# Patient Record
Sex: MALE | Race: BLACK OR AFRICAN AMERICAN | NOT HISPANIC OR LATINO | URBAN - METROPOLITAN AREA
[De-identification: names, ages, dates, MRNs, and addresses within clinical notes are randomized per-mention and may not be internally consistent; named-entity substitution may affect disease eponyms.]

---

## 2020-07-25 ENCOUNTER — TELEPHONE ENCOUNTER (OUTPATIENT)
Dept: URBAN - METROPOLITAN AREA CLINIC 13 | Facility: CLINIC | Age: 70
End: 2020-07-25

## 2020-07-25 RX ORDER — TELMISARTAN AND HYDROCHLOROTHIAZIDE 80; 25 MG/1; MG/1
TAKE 1 TABLET DAILY TABLET ORAL
Refills: 0 | OUTPATIENT
Start: 2010-12-02 | End: 2011-01-24

## 2020-07-26 ENCOUNTER — TELEPHONE ENCOUNTER (OUTPATIENT)
Dept: URBAN - METROPOLITAN AREA CLINIC 13 | Facility: CLINIC | Age: 70
End: 2020-07-26

## 2021-07-09 ENCOUNTER — WEB ENCOUNTER (OUTPATIENT)
Dept: URBAN - METROPOLITAN AREA CLINIC 113 | Facility: CLINIC | Age: 71
End: 2021-07-09

## 2021-07-09 ENCOUNTER — OFFICE VISIT (OUTPATIENT)
Dept: URBAN - METROPOLITAN AREA CLINIC 113 | Facility: CLINIC | Age: 71
End: 2021-07-09
Payer: MEDICARE

## 2021-07-09 VITALS
TEMPERATURE: 97.8 F | RESPIRATION RATE: 18 BRPM | SYSTOLIC BLOOD PRESSURE: 102 MMHG | DIASTOLIC BLOOD PRESSURE: 70 MMHG | WEIGHT: 153 LBS | BODY MASS INDEX: 22.66 KG/M2 | HEIGHT: 69 IN | HEART RATE: 79 BPM

## 2021-07-09 DIAGNOSIS — Z87.19 HISTORY OF UPPER GASTROINTESTINAL BLEEDING: ICD-10-CM

## 2021-07-09 DIAGNOSIS — Z12.12 ENCOUNTER FOR SCREENING FOR MALIGNANT NEOPLASM OF RECTUM: ICD-10-CM

## 2021-07-09 DIAGNOSIS — Z12.11 ENCOUNTER FOR SCREENING FOR MALIGNANT NEOPLASM OF COLON: ICD-10-CM

## 2021-07-09 PROCEDURE — 99203 OFFICE O/P NEW LOW 30 MIN: CPT | Performed by: INTERNAL MEDICINE

## 2021-07-09 RX ORDER — METFORMIN HYDROCHLORIDE 850 MG/1
1 TABLET WITH A MEAL TABLET, FILM COATED ORAL ONCE A DAY
Status: ACTIVE | COMMUNITY

## 2021-07-09 RX ORDER — EZETIMIBE 10 MG/1
1 TABLET TABLET ORAL ONCE A DAY
Status: ACTIVE | COMMUNITY

## 2021-07-09 RX ORDER — VITAMIN A 2400 MCG
1 TABLET CAPSULE ORAL ONCE A DAY
Status: ACTIVE | COMMUNITY

## 2021-07-09 RX ORDER — SILDENAFIL 20 MG/1
1 TABLET TABLET, FILM COATED ORAL ONCE A DAY
Status: ACTIVE | COMMUNITY

## 2021-07-09 RX ORDER — ATORVASTATIN CALCIUM 40 MG/1
1 TABLET TABLET, FILM COATED ORAL ONCE A DAY
Status: ACTIVE | COMMUNITY

## 2021-07-09 RX ORDER — LANCETS 26 GAUGE
AS DIRECTED EACH MISCELLANEOUS
Status: ACTIVE | COMMUNITY

## 2021-07-09 RX ORDER — TELMISARTAN 80 MG/1
1 TABLET TABLET ORAL ONCE A DAY
Status: ACTIVE | COMMUNITY

## 2021-07-09 RX ORDER — HYDROCHLOROTHIAZIDE 25 MG/1
1 TABLET IN THE MORNING TABLET ORAL ONCE A DAY
Status: ACTIVE | COMMUNITY

## 2021-07-09 NOTE — HPI-TODAY'S VISIT:
Mr. Mijares is a 71-year-old  male here for procedure follow up. He had a colonoscopy on 1-24-11 for screening purposes. Internal hemorrhoids were found without other abnormalities. He denies GI complaints. No family history of GI disease or malignancy. He is technically due for repeat screening colonoscopy at this time.  However, the patient was hospitalized from May 27- May 28, 2021 with nausea, vomiting, and states that he was "throwing up blood." This hospitalization was at [facility].  He apparently was seen at one of our physicians during that hospital stay.  I do not have these records.  He had a troponin elevation during that time, and was felt to have had a receent myocardial infarction.  He had a cardiac catheterization 2 days ago by Dr. Cunha which was reportedly negative.  We do not have any of these records either.  He is currently asymptomatic, and is actually here to get scheduled for a screening colonoscopy.  He has no rectal bleeding or change in bowel habits.   I did have a chest to review the [facility] records.  He was hospitalized with hemoptysis, not hematemesis, and notably had a hemoglobin of approximately 9 with an MCV of 79.  He had a non-STEMI, and was evaluated by cardiology during that hospital stay.  He was not vomiting blood.

## 2021-07-10 ENCOUNTER — LAB OUTSIDE AN ENCOUNTER (OUTPATIENT)
Dept: URBAN - METROPOLITAN AREA CLINIC 113 | Facility: CLINIC | Age: 71
End: 2021-07-10

## 2021-12-13 ENCOUNTER — LAB OUTSIDE AN ENCOUNTER (OUTPATIENT)
Dept: URBAN - METROPOLITAN AREA CLINIC 113 | Facility: CLINIC | Age: 71
End: 2021-12-13

## 2021-12-13 ENCOUNTER — OFFICE VISIT (OUTPATIENT)
Dept: URBAN - METROPOLITAN AREA CLINIC 113 | Facility: CLINIC | Age: 71
End: 2021-12-13
Payer: MEDICARE

## 2021-12-13 VITALS
RESPIRATION RATE: 18 BRPM | SYSTOLIC BLOOD PRESSURE: 110 MMHG | HEART RATE: 82 BPM | DIASTOLIC BLOOD PRESSURE: 73 MMHG | WEIGHT: 163 LBS | HEIGHT: 69 IN | BODY MASS INDEX: 24.14 KG/M2 | TEMPERATURE: 98.2 F

## 2021-12-13 DIAGNOSIS — Z87.19 HISTORY OF UPPER GASTROINTESTINAL BLEEDING: ICD-10-CM

## 2021-12-13 DIAGNOSIS — Z12.12 ENCOUNTER FOR SCREENING FOR MALIGNANT NEOPLASM OF RECTUM: ICD-10-CM

## 2021-12-13 DIAGNOSIS — Z12.11 ENCOUNTER FOR SCREENING FOR MALIGNANT NEOPLASM OF COLON: ICD-10-CM

## 2021-12-13 PROCEDURE — 99213 OFFICE O/P EST LOW 20 MIN: CPT | Performed by: INTERNAL MEDICINE

## 2021-12-13 RX ORDER — SILDENAFIL 20 MG/1
1 TABLET TABLET, FILM COATED ORAL ONCE A DAY
Status: ACTIVE | COMMUNITY

## 2021-12-13 RX ORDER — VITAMIN A 2400 MCG
1 TABLET CAPSULE ORAL ONCE A DAY
Status: ACTIVE | COMMUNITY

## 2021-12-13 RX ORDER — EZETIMIBE 10 MG/1
1 TABLET TABLET ORAL ONCE A DAY
Status: ACTIVE | COMMUNITY

## 2021-12-13 RX ORDER — HYDROCHLOROTHIAZIDE 25 MG/1
1 TABLET IN THE MORNING TABLET ORAL ONCE A DAY
Status: ACTIVE | COMMUNITY

## 2021-12-13 RX ORDER — POLYETHYLENE GLYCOL 3350, SODIUM CHLORIDE, SODIUM BICARBONATE, POTASSIUM CHLORIDE 420; 11.2; 5.72; 1.48 G/4L; G/4L; G/4L; G/4L
AS DIRECTED POWDER, FOR SOLUTION ORAL ONCE
Qty: 10 | Refills: 1 | OUTPATIENT
Start: 2021-12-13 | End: 2022-02-11

## 2021-12-13 RX ORDER — METFORMIN HYDROCHLORIDE 850 MG/1
1 TABLET WITH A MEAL TABLET, FILM COATED ORAL ONCE A DAY
Status: ACTIVE | COMMUNITY

## 2021-12-13 RX ORDER — ATORVASTATIN CALCIUM 40 MG/1
1 TABLET TABLET, FILM COATED ORAL ONCE A DAY
Status: ACTIVE | COMMUNITY

## 2021-12-13 RX ORDER — TELMISARTAN 80 MG/1
1 TABLET TABLET ORAL ONCE A DAY
Status: ACTIVE | COMMUNITY

## 2021-12-13 RX ORDER — LANCETS 26 GAUGE
AS DIRECTED EACH MISCELLANEOUS
Status: ACTIVE | COMMUNITY

## 2021-12-13 NOTE — HPI-TODAY'S VISIT:
Mr. Mijares is a 71-year-old  male here for procedure follow up. He had a colonoscopy on 1-24-11 for screening purposes. Internal hemorrhoids were found without other abnormalities. He denies GI complaints. No family history of GI disease or malignancy. He is technically due for repeat screening colonoscopy at this time.  However, the patient was hospitalized from May 27- May 28, 2021 with nausea, vomiting, and states that he was "throwing up blood." This hospitalization was at [facility].  He apparently was seen at one of our physicians during that hospital stay.  I do not have these records.  He had a troponin elevation during that time, and was felt to have had a receent myocardial infarction.  He had a cardiac catheterization 2 days ago by Dr. Cunha which was reportedly negative.  We do not have any of these records either.  He is currently asymptomatic, and is actually here to get scheduled for a screening colonoscopy.  He has no rectal bleeding or change in bowel habits.   I did have a chest to review the [facility] records.  He was hospitalized with hemoptysis, not hematemesis, and notably had a hemoglobin of approximately 9 with an MCV of 79.  He had a non-STEMI, and was evaluated by cardiology during that hospital stay.  He was not vomiting blood.   He is currently completely  asymptomatic and has been cleared by his cardiologist for the performance of any elective procedures.  He has no GI complaints today.

## 2022-01-31 ENCOUNTER — OFFICE VISIT (OUTPATIENT)
Dept: URBAN - METROPOLITAN AREA SURGERY CENTER 25 | Facility: SURGERY CENTER | Age: 72
End: 2022-01-31
Payer: MEDICARE

## 2022-01-31 DIAGNOSIS — Z12.11 COLON CANCER SCREENING: ICD-10-CM

## 2022-01-31 PROCEDURE — G0121 COLON CA SCRN NOT HI RSK IND: HCPCS | Performed by: INTERNAL MEDICINE

## 2022-01-31 PROCEDURE — G8907 PT DOC NO EVENTS ON DISCHARG: HCPCS | Performed by: INTERNAL MEDICINE

## 2022-01-31 RX ORDER — EZETIMIBE 10 MG/1
1 TABLET TABLET ORAL ONCE A DAY
Status: ACTIVE | COMMUNITY

## 2022-01-31 RX ORDER — LANCETS 26 GAUGE
AS DIRECTED EACH MISCELLANEOUS
Status: ACTIVE | COMMUNITY

## 2022-01-31 RX ORDER — POLYETHYLENE GLYCOL 3350, SODIUM CHLORIDE, SODIUM BICARBONATE, POTASSIUM CHLORIDE 420; 11.2; 5.72; 1.48 G/4L; G/4L; G/4L; G/4L
AS DIRECTED POWDER, FOR SOLUTION ORAL ONCE
Qty: 10 | Refills: 1 | Status: ACTIVE | COMMUNITY
Start: 2021-12-13 | End: 2022-02-11

## 2022-01-31 RX ORDER — METFORMIN HYDROCHLORIDE 850 MG/1
1 TABLET WITH A MEAL TABLET, FILM COATED ORAL ONCE A DAY
Status: ACTIVE | COMMUNITY

## 2022-01-31 RX ORDER — ATORVASTATIN CALCIUM 40 MG/1
1 TABLET TABLET, FILM COATED ORAL ONCE A DAY
Status: ACTIVE | COMMUNITY

## 2022-01-31 RX ORDER — TELMISARTAN 80 MG/1
1 TABLET TABLET ORAL ONCE A DAY
Status: ACTIVE | COMMUNITY

## 2022-01-31 RX ORDER — VITAMIN A 2400 MCG
1 TABLET CAPSULE ORAL ONCE A DAY
Status: ACTIVE | COMMUNITY

## 2022-01-31 RX ORDER — HYDROCHLOROTHIAZIDE 25 MG/1
1 TABLET IN THE MORNING TABLET ORAL ONCE A DAY
Status: ACTIVE | COMMUNITY

## 2022-01-31 RX ORDER — SILDENAFIL 20 MG/1
1 TABLET TABLET, FILM COATED ORAL ONCE A DAY
Status: ACTIVE | COMMUNITY

## 2022-03-29 ENCOUNTER — OFFICE VISIT (OUTPATIENT)
Dept: URBAN - METROPOLITAN AREA CLINIC 113 | Facility: CLINIC | Age: 72
End: 2022-03-29

## 2022-12-28 ENCOUNTER — WEB ENCOUNTER (OUTPATIENT)
Dept: URBAN - METROPOLITAN AREA CLINIC 113 | Facility: CLINIC | Age: 72
End: 2022-12-28

## 2023-01-03 ENCOUNTER — OFFICE VISIT (OUTPATIENT)
Dept: URBAN - METROPOLITAN AREA CLINIC 113 | Facility: CLINIC | Age: 73
End: 2023-01-03
Payer: MEDICARE

## 2023-01-03 ENCOUNTER — LAB OUTSIDE AN ENCOUNTER (OUTPATIENT)
Dept: URBAN - METROPOLITAN AREA CLINIC 113 | Facility: CLINIC | Age: 73
End: 2023-01-03

## 2023-01-03 VITALS
BODY MASS INDEX: 23.85 KG/M2 | SYSTOLIC BLOOD PRESSURE: 160 MMHG | RESPIRATION RATE: 16 BRPM | TEMPERATURE: 97.8 F | DIASTOLIC BLOOD PRESSURE: 98 MMHG | WEIGHT: 161 LBS | HEIGHT: 69 IN | HEART RATE: 83 BPM

## 2023-01-03 DIAGNOSIS — Z87.19 HISTORY OF UPPER GASTROINTESTINAL BLEEDING: ICD-10-CM

## 2023-01-03 DIAGNOSIS — D50.8 OTHER IRON DEFICIENCY ANEMIA: ICD-10-CM

## 2023-01-03 PROBLEM — 234349007: Status: ACTIVE | Noted: 2023-01-03

## 2023-01-03 PROCEDURE — 99214 OFFICE O/P EST MOD 30 MIN: CPT | Performed by: INTERNAL MEDICINE

## 2023-01-03 RX ORDER — HYDROCHLOROTHIAZIDE 25 MG/1
1 TABLET IN THE MORNING TABLET ORAL ONCE A DAY
Status: ACTIVE | COMMUNITY

## 2023-01-03 RX ORDER — METFORMIN HYDROCHLORIDE 850 MG/1
1 TABLET WITH A MEAL TABLET, FILM COATED ORAL ONCE A DAY
Status: ACTIVE | COMMUNITY

## 2023-01-03 RX ORDER — ATORVASTATIN CALCIUM 40 MG/1
1 TABLET TABLET, FILM COATED ORAL ONCE A DAY
Status: ACTIVE | COMMUNITY

## 2023-01-03 RX ORDER — EZETIMIBE 10 MG/1
1 TABLET TABLET ORAL ONCE A DAY
Status: ACTIVE | COMMUNITY

## 2023-01-03 RX ORDER — SILDENAFIL 20 MG/1
1 TABLET TABLET, FILM COATED ORAL ONCE A DAY
Status: ON HOLD | COMMUNITY

## 2023-01-03 RX ORDER — LANCETS 26 GAUGE
AS DIRECTED EACH MISCELLANEOUS
Status: ACTIVE | COMMUNITY

## 2023-01-03 RX ORDER — TELMISARTAN 80 MG/1
1 TABLET TABLET ORAL ONCE A DAY
Status: ACTIVE | COMMUNITY

## 2023-01-03 RX ORDER — VITAMIN A 2400 MCG
1 TABLET CAPSULE ORAL ONCE A DAY
Status: ACTIVE | COMMUNITY

## 2023-01-03 RX ORDER — OMEPRAZOLE 40 MG/1
1 CAPSULE 30 MINUTES BEFORE MORNING MEAL CAPSULE, DELAYED RELEASE ORAL ONCE A DAY
Qty: 30 | OUTPATIENT
Start: 2023-01-03

## 2023-01-03 NOTE — HPI-TODAY'S VISIT:
Mr. Mijares is a 72-year-old  male here for follow up. He was last seen here on 12/13/21.    The patient was hospitalized from May 27- May 28, 2021 with nausea, vomiting, and states that he was "throwing up blood." This hospitalization was at [facility].  He apparently seen at one of our physicians during that hospital stay.  I did have a chance to review the [facility] records.  He was hospitalized with hemoptysis, not hematemesis, and notably had a hemoglobin of approximately 9 with an MCV of 79.  He had a non-STEMI, and was evaluated by cardiology during that hospital stay.  He was not vomiting blood. He had a cardiac catheterization last year by Dr. Cunha which was negative. At the time of his 12/13/21 office visit, he was asymptomatic, and presented to be scheduled for a screening colonoscopy.  He had no rectal bleeding or change in bowel habits.  He had a prior colonoscopy on 1-24-11 for screening purposes. Internal hemorrhoids were found without other abnormalities. He denies GI complaints. No family history of GI disease or malignancy.   He underwent colonoscopy on January 31, 2022.  This only showed internal hemorrhoids and diverticulosis.  A 10-year follow-up examination was recommended.  The patient did not come for his scheduled follow-up visit on March 29, 2022.  The patient had a loose stool recently and was found to have a fecal occult blood test which was positive for occult blood on November night, 2022.  His hemoglobin around that time was 9.6, with a hematocrit of 30%.  His MCV was 73.7.  This is a decrease from a hemoglobin of 11.2 earlier this year.  He denies any hematemesis, coughing emesis, bright red rectal bleeding or melena.  He tells me that he donates blood on a regular basis, and as a result is always anemic, with a baseline hemoglobin typically between 10 and 11, although it has been as low as 9 in the past.  He cannot recall ever having had an upper endoscopy.  He is not having abdominal pain and does not use nonsteroidals, although he does take an 81 mg aspirin daily.  He is currently completely  asymptomatic.  He has no GI complaints today.

## 2023-01-06 PROBLEM — 305058001: Status: ACTIVE | Noted: 2021-12-17

## 2023-01-06 LAB
ABSOLUTE BASOPHILS: 11
ABSOLUTE EOSINOPHILS: 119
ABSOLUTE LYMPHOCYTES: 1054
ABSOLUTE MONOCYTES: 532
ABSOLUTE NEUTROPHILS: 1785
BASOPHILS: 0.3
EOSINOPHILS: 3.4
HEMATOCRIT: 32.2
HEMOGLOBIN: 10.3
LYMPHOCYTES: 30.1
MCH: 25.2
MCHC: 32
MCV: 78.9
MONOCYTES: 15.2
MPV: 11.4
NEUTROPHILS: 51
PLATELET COUNT: 248
RDW: 15.6
RED BLOOD CELL COUNT: 4.08
WHITE BLOOD CELL COUNT: 3.5

## 2023-01-27 ENCOUNTER — OFFICE VISIT (OUTPATIENT)
Dept: URBAN - METROPOLITAN AREA SURGERY CENTER 25 | Facility: SURGERY CENTER | Age: 73
End: 2023-01-27
Payer: MEDICARE

## 2023-01-27 ENCOUNTER — CLAIMS CREATED FROM THE CLAIM WINDOW (OUTPATIENT)
Dept: URBAN - METROPOLITAN AREA CLINIC 4 | Facility: CLINIC | Age: 73
End: 2023-01-27
Payer: MEDICARE

## 2023-01-27 DIAGNOSIS — K29.50 CHRONIC ANTRAL GASTRITIS: ICD-10-CM

## 2023-01-27 DIAGNOSIS — K31.A0 GASTRIC INTESTINAL METAPLASIA, UNSPECIFIED: ICD-10-CM

## 2023-01-27 DIAGNOSIS — D50.9 IRON DEFICIENCY ANEMIA: ICD-10-CM

## 2023-01-27 DIAGNOSIS — K31.89 OTHER DISEASES OF STOMACH AND DUODENUM: ICD-10-CM

## 2023-01-27 PROBLEM — 87522002 IRON DEFICIENCY ANEMIA: Status: ACTIVE | Noted: 2023-01-27

## 2023-01-27 PROCEDURE — G8907 PT DOC NO EVENTS ON DISCHARG: HCPCS | Performed by: INTERNAL MEDICINE

## 2023-01-27 PROCEDURE — 88305 TISSUE EXAM BY PATHOLOGIST: CPT | Performed by: PATHOLOGY

## 2023-01-27 PROCEDURE — 88312 SPECIAL STAINS GROUP 1: CPT | Performed by: PATHOLOGY

## 2023-01-27 PROCEDURE — 43239 EGD BIOPSY SINGLE/MULTIPLE: CPT | Performed by: INTERNAL MEDICINE

## 2023-01-27 RX ORDER — ATORVASTATIN CALCIUM 40 MG/1
1 TABLET TABLET, FILM COATED ORAL ONCE A DAY
Status: ACTIVE | COMMUNITY

## 2023-01-27 RX ORDER — HYDROCHLOROTHIAZIDE 25 MG/1
1 TABLET IN THE MORNING TABLET ORAL ONCE A DAY
Status: ACTIVE | COMMUNITY

## 2023-01-27 RX ORDER — EZETIMIBE 10 MG/1
1 TABLET TABLET ORAL ONCE A DAY
Status: ACTIVE | COMMUNITY

## 2023-01-27 RX ORDER — OMEPRAZOLE 40 MG/1
1 CAPSULE 30 MINUTES BEFORE MORNING MEAL CAPSULE, DELAYED RELEASE ORAL ONCE A DAY
Qty: 30 | Status: ACTIVE | COMMUNITY
Start: 2023-01-03

## 2023-01-27 RX ORDER — VITAMIN A 2400 MCG
1 TABLET CAPSULE ORAL ONCE A DAY
Status: ACTIVE | COMMUNITY

## 2023-01-27 RX ORDER — LANCETS 26 GAUGE
AS DIRECTED EACH MISCELLANEOUS
Status: ACTIVE | COMMUNITY

## 2023-01-27 RX ORDER — METFORMIN HYDROCHLORIDE 850 MG/1
1 TABLET WITH A MEAL TABLET, FILM COATED ORAL ONCE A DAY
Status: ACTIVE | COMMUNITY

## 2023-01-27 RX ORDER — SILDENAFIL 20 MG/1
1 TABLET TABLET, FILM COATED ORAL ONCE A DAY
Status: ON HOLD | COMMUNITY

## 2023-01-27 RX ORDER — TELMISARTAN 80 MG/1
1 TABLET TABLET ORAL ONCE A DAY
Status: ACTIVE | COMMUNITY

## 2023-02-15 PROBLEM — 89790007 CHRONIC ANTRAL GASTRITIS: Status: ACTIVE | Noted: 2023-02-15

## 2023-02-15 PROBLEM — 87522002 IRON DEFICIENCY ANEMIA: Status: ACTIVE | Noted: 2023-02-15

## 2023-03-13 ENCOUNTER — OFFICE VISIT (OUTPATIENT)
Dept: URBAN - METROPOLITAN AREA CLINIC 113 | Facility: CLINIC | Age: 73
End: 2023-03-13
Payer: MEDICARE

## 2023-03-13 ENCOUNTER — LAB OUTSIDE AN ENCOUNTER (OUTPATIENT)
Dept: URBAN - METROPOLITAN AREA CLINIC 113 | Facility: CLINIC | Age: 73
End: 2023-03-13

## 2023-03-13 VITALS
DIASTOLIC BLOOD PRESSURE: 91 MMHG | SYSTOLIC BLOOD PRESSURE: 156 MMHG | HEIGHT: 69 IN | BODY MASS INDEX: 23.4 KG/M2 | WEIGHT: 158 LBS | TEMPERATURE: 97.5 F | HEART RATE: 81 BPM | RESPIRATION RATE: 16 BRPM

## 2023-03-13 DIAGNOSIS — D50.9 MICROCYTIC ANEMIA: ICD-10-CM

## 2023-03-13 DIAGNOSIS — Z87.19 HISTORY OF UPPER GASTROINTESTINAL BLEEDING: ICD-10-CM

## 2023-03-13 PROCEDURE — 99213 OFFICE O/P EST LOW 20 MIN: CPT | Performed by: INTERNAL MEDICINE

## 2023-03-13 RX ORDER — ATORVASTATIN CALCIUM 40 MG/1
1 TABLET TABLET, FILM COATED ORAL ONCE A DAY
Status: ACTIVE | COMMUNITY

## 2023-03-13 RX ORDER — VITAMIN A 2400 MCG
1 TABLET CAPSULE ORAL ONCE A DAY
Status: ACTIVE | COMMUNITY

## 2023-03-13 RX ORDER — EZETIMIBE 10 MG/1
1 TABLET TABLET ORAL ONCE A DAY
Status: ACTIVE | COMMUNITY

## 2023-03-13 RX ORDER — HYDROCHLOROTHIAZIDE 25 MG/1
1 TABLET IN THE MORNING TABLET ORAL ONCE A DAY
Status: ON HOLD | COMMUNITY

## 2023-03-13 RX ORDER — TELMISARTAN 80 MG/1
1 TABLET TABLET ORAL ONCE A DAY
Status: ACTIVE | COMMUNITY

## 2023-03-13 RX ORDER — OMEPRAZOLE 40 MG/1
1 CAPSULE 30 MINUTES BEFORE MORNING MEAL CAPSULE, DELAYED RELEASE ORAL ONCE A DAY
Qty: 30 | Status: ON HOLD | COMMUNITY
Start: 2023-01-03

## 2023-03-13 RX ORDER — SILDENAFIL 20 MG/1
1 TABLET TABLET, FILM COATED ORAL ONCE A DAY
Status: ON HOLD | COMMUNITY

## 2023-03-13 RX ORDER — LANCETS 26 GAUGE
AS DIRECTED EACH MISCELLANEOUS
Status: ACTIVE | COMMUNITY

## 2023-03-13 RX ORDER — METFORMIN HYDROCHLORIDE 850 MG/1
1 TABLET WITH A MEAL TABLET, FILM COATED ORAL ONCE A DAY
Status: ACTIVE | COMMUNITY

## 2023-03-13 NOTE — HPI-TODAY'S VISIT:
Mr. Mijares is a 73-year-old  male here for follow up. He was last seen here on 1/3/23.  The patient was hospitalized [facility] from May 27- May 28, 2021 with nausea, vomiting, and states that he was "throwing up blood."  He  was seen by one of our physicians during that hospital stay.  I did have a chance to review the [facility] records.  He was hospitalized with hemoptysis, not hematemesis, and notably had a hemoglobin of approximately 9 with an MCV of 79.  He had a non-STEMI, and was evaluated by cardiology during that hospital stay.  He was not vomiting blood. He had a cardiac catheterization last year by Dr. Cunha which was negative. At the time of his 12/13/21 office visit, he was asymptomatic, and presented to be scheduled for a screening colonoscopy.  He had no rectal bleeding or change in bowel habits.  He had a prior colonoscopy on 1-24-11 for screening purposes. Internal hemorrhoids were found without other abnormalities. He denies GI complaints. No family history of GI disease or malignancy.   He underwent colonoscopy on January 31, 2022.  This only showed internal hemorrhoids and diverticulosis.  A 10-year follow-up examination was recommended.    The patient had a loose stool recently and was found to have a fecal occult blood test which was positive for occult blood on November night, 2022.  His hemoglobin around that time was 9.6, with a hematocrit of 30%.  His MCV was 73.7.  This is a decrease from a hemoglobin of 11.2 earlier this year.  He denied any hematemesis, coffee ground emesis, bright red rectal bleeding or melena.  He donates blood on a regular basis, and as a result is always anemic, with a baseline hemoglobin typically between 10 and 11, although it has been as low as 9 in the past.  He cannot recall ever having had an upper endoscopy.  He is not having abdominal pain and does not use nonsteroidals, although he does take an 81 mg aspirin daily.  He was completely  asymptomatic at the time of his 1/3/23 visit, with no GI complaints.  His CBC was notable for a HgB of 10.5, Hct 32.2%.    The patient underwent upper GI endoscopy on January 27, 2023.  This revealed an H. pylori negative gastritis.  Small bowel biopsies were negative for sprue.  The patient has no complaints of GI bleeding, abdominal pain, change in bowel habits, nausea, vomiting, etc. at the present time

## 2023-03-20 ENCOUNTER — TELEPHONE ENCOUNTER (OUTPATIENT)
Dept: URBAN - METROPOLITAN AREA CLINIC 113 | Facility: CLINIC | Age: 73
End: 2023-03-20

## 2023-03-20 PROBLEM — 413533008 ANEMIA DUE TO CHRONIC BLOOD LOSS: Status: ACTIVE | Noted: 2023-03-20

## 2023-03-21 LAB
ABSOLUTE BASOPHILS: 9
ABSOLUTE EOSINOPHILS: 60
ABSOLUTE LYMPHOCYTES: 968
ABSOLUTE MONOCYTES: 495
ABSOLUTE NEUTROPHILS: 2769
BASOPHILS: 0.2
EOSINOPHILS: 1.4
HEMATOCRIT: 31.8
HEMOGLOBIN: 9.7
LYMPHOCYTES: 22.5
MCH: 23.5
MCHC: 30.5
MCV: 77
MONOCYTES: 11.5
MPV: 11.7
NEUTROPHILS: 64.4
PLATELET COUNT: 252
RDW: 15.3
RED BLOOD CELL COUNT: 4.13
WHITE BLOOD CELL COUNT: 4.3

## 2023-03-31 ENCOUNTER — CLAIMS CREATED FROM THE CLAIM WINDOW (OUTPATIENT)
Dept: URBAN - METROPOLITAN AREA CLINIC 112 | Facility: CLINIC | Age: 73
End: 2023-03-31
Payer: MEDICARE

## 2023-03-31 ENCOUNTER — TELEPHONE ENCOUNTER (OUTPATIENT)
Dept: URBAN - METROPOLITAN AREA CLINIC 113 | Facility: CLINIC | Age: 73
End: 2023-03-31

## 2023-03-31 ENCOUNTER — OFFICE VISIT (OUTPATIENT)
Dept: URBAN - METROPOLITAN AREA CLINIC 112 | Facility: CLINIC | Age: 73
End: 2023-03-31

## 2023-03-31 VITALS
TEMPERATURE: 97.3 F | WEIGHT: 155 LBS | HEIGHT: 69 IN | BODY MASS INDEX: 22.96 KG/M2 | SYSTOLIC BLOOD PRESSURE: 172 MMHG | DIASTOLIC BLOOD PRESSURE: 103 MMHG | RESPIRATION RATE: 16 BRPM | HEART RATE: 83 BPM

## 2023-03-31 DIAGNOSIS — D50.0 1. ANEMIA, IRON DEFICIENCY FROM CHRONIC BLOOD LOSS:: ICD-10-CM

## 2023-03-31 PROCEDURE — 91110 GI TRC IMG INTRAL ESOPH-ILE: CPT | Performed by: INTERNAL MEDICINE

## 2023-03-31 RX ORDER — SILDENAFIL 20 MG/1
1 TABLET TABLET, FILM COATED ORAL ONCE A DAY
Status: ON HOLD | COMMUNITY

## 2023-03-31 RX ORDER — HYDROCHLOROTHIAZIDE 25 MG/1
1 TABLET IN THE MORNING TABLET ORAL ONCE A DAY
Status: ON HOLD | COMMUNITY

## 2023-03-31 RX ORDER — TELMISARTAN 80 MG/1
1 TABLET TABLET ORAL ONCE A DAY
Status: ACTIVE | COMMUNITY

## 2023-03-31 RX ORDER — EZETIMIBE 10 MG/1
1 TABLET TABLET ORAL ONCE A DAY
Status: ACTIVE | COMMUNITY

## 2023-03-31 RX ORDER — ATORVASTATIN CALCIUM 40 MG/1
1 TABLET TABLET, FILM COATED ORAL ONCE A DAY
Status: ACTIVE | COMMUNITY

## 2023-03-31 RX ORDER — OMEPRAZOLE 40 MG/1
1 CAPSULE 30 MINUTES BEFORE MORNING MEAL CAPSULE, DELAYED RELEASE ORAL ONCE A DAY
Qty: 30 | Status: ON HOLD | COMMUNITY
Start: 2023-01-03

## 2023-03-31 RX ORDER — METFORMIN HYDROCHLORIDE 850 MG/1
1 TABLET WITH A MEAL TABLET, FILM COATED ORAL ONCE A DAY
Status: ACTIVE | COMMUNITY

## 2023-03-31 RX ORDER — LANCETS 26 GAUGE
AS DIRECTED EACH MISCELLANEOUS
Status: ACTIVE | COMMUNITY

## 2023-03-31 RX ORDER — VITAMIN A 2400 MCG
1 TABLET CAPSULE ORAL ONCE A DAY
Status: ACTIVE | COMMUNITY

## 2024-02-26 ENCOUNTER — OV EP (OUTPATIENT)
Dept: URBAN - METROPOLITAN AREA CLINIC 113 | Facility: CLINIC | Age: 74
End: 2024-02-26
Payer: MEDICARE

## 2024-02-26 VITALS
RESPIRATION RATE: 20 BRPM | TEMPERATURE: 97.5 F | WEIGHT: 155.8 LBS | DIASTOLIC BLOOD PRESSURE: 96 MMHG | BODY MASS INDEX: 23.08 KG/M2 | HEIGHT: 69 IN | HEART RATE: 66 BPM | SYSTOLIC BLOOD PRESSURE: 159 MMHG

## 2024-02-26 DIAGNOSIS — D50.9 MICROCYTIC ANEMIA: ICD-10-CM

## 2024-02-26 PROCEDURE — 99213 OFFICE O/P EST LOW 20 MIN: CPT | Performed by: INTERNAL MEDICINE

## 2024-02-26 RX ORDER — METFORMIN HYDROCHLORIDE 850 MG/1
1 TABLET WITH A MEAL TABLET, FILM COATED ORAL TWICE A DAY
Status: ACTIVE | COMMUNITY

## 2024-02-26 RX ORDER — PEN NEEDLE, DIABETIC 31 GX5/16"
AS DIRECTED NEEDLE, DISPOSABLE MISCELLANEOUS
Status: ACTIVE | COMMUNITY

## 2024-02-26 RX ORDER — SILDENAFIL 20 MG/1
1 TABLET TABLET, FILM COATED ORAL ONCE A DAY
Status: ON HOLD | COMMUNITY

## 2024-02-26 RX ORDER — OMEPRAZOLE 40 MG/1
1 CAPSULE 30 MINUTES BEFORE MORNING MEAL CAPSULE, DELAYED RELEASE ORAL ONCE A DAY
Qty: 30 | Status: ON HOLD | COMMUNITY
Start: 2023-01-03

## 2024-02-26 RX ORDER — EZETIMIBE 10 MG/1
1 TABLET TABLET ORAL ONCE A DAY
Status: ACTIVE | COMMUNITY

## 2024-02-26 RX ORDER — ATORVASTATIN CALCIUM 40 MG/1
1 TABLET TABLET, FILM COATED ORAL ONCE A DAY
Status: ACTIVE | COMMUNITY

## 2024-02-26 RX ORDER — TELMISARTAN 80 MG/1
1 TABLET TABLET ORAL ONCE A DAY
Status: ACTIVE | COMMUNITY

## 2024-02-26 RX ORDER — HYDROCHLOROTHIAZIDE 25 MG/1
1 TABLET IN THE MORNING TABLET ORAL ONCE A DAY
Status: ON HOLD | COMMUNITY

## 2024-02-26 RX ORDER — VITAMIN A 2400 MCG
1 TABLET CAPSULE ORAL ONCE A DAY
Status: ACTIVE | COMMUNITY

## 2024-02-26 NOTE — HPI-TODAY'S VISIT:
Mr. Mijares is a 74-year-old  male here for follow up. He was last seen here on 3/13/23.  The patient was hospitalized [facility] from May 27- May 28, 2021 with nausea, vomiting, and states that he was "throwing up blood."  He  was seen by one of our physicians during that hospital stay.  I did have a chance to review the [facility] records.  He was hospitalized with hemoptysis, not hematemesis, and notably had a hemoglobin of approximately 9 with an MCV of 79.  He had a non-STEMI, and was evaluated by cardiology during that hospital stay.  He was not vomiting blood. He had a cardiac catheterization last year by Dr. Cunha which was negative. At the time of his 12/13/21 office visit, he was asymptomatic, and presented to be scheduled for a screening colonoscopy.  He had no rectal bleeding or change in bowel habits.  He had a prior colonoscopy on 1-24-11 for screening purposes. Internal hemorrhoids were found without other abnormalities. He denies GI complaints. No family history of GI disease or malignancy.   He underwent colonoscopy on January 31, 2022.  This only showed internal hemorrhoids and diverticulosis.  A 10-year follow-up examination was recommended.    The patient was found to have a fecal occult blood test which was positive for occult blood on November 9, 2022.  His hemoglobin around that time was 9.6, with a hematocrit of 30%.  His MCV was 73.7.  This was a decrease from a hemoglobin of 11.2 earlier this year.  He denied any hematemesis, coffee ground emesis, bright red rectal bleeding or melena.  He donates blood on a regular basis, and as a result is always anemic, with a baseline hemoglobin typically between 10 and 11, although it has been as low as 9 in the past.  He could not recall ever having had an upper endoscopy.  He was not having abdominal pain and does not use nonsteroidals, although he did take an 81 mg aspirin daily.  He was completely  asymptomatic at the time of his 1/3/23 visit, with no GI complaints.  His CBC was notable for a HgB of 10.5, Hct 32.2%.    The patient underwent upper GI endoscopy on January 27, 2023.  This revealed an H. pylori negative gastritis.  Small bowel biopsies were negative for sprue.  The patient had no complaints of GI bleeding, abdominal pain, change in bowel habits, nausea, vomiting, etc. at the  time of his 3/13/23 office visit.  Patient had a capsule enteroscopy done on March 31, 2023 which was normal.  He had a CBC done most recently in October 2023 showed a white blood cell count of 4000, hemoglobin 10.3, hematocrit 30.1%, and a platelet count 256,000.  His MCV was 73.9.  He tells me that he has had no clinically evident bleeding, and denies melena, abdominal pain, nausea, or other symptoms.  He also tells me he has had anemia "all of his life."  This was previously attributed to blood transfusion.

## 2024-02-28 LAB
ABSOLUTE BASOPHILS: 19
ABSOLUTE EOSINOPHILS: 89
ABSOLUTE LYMPHOCYTES: 1162
ABSOLUTE MONOCYTES: 466
ABSOLUTE NEUTROPHILS: 1965
BASOPHILS: 0.5
EOSINOPHILS: 2.4
FERRITIN, SERUM: 52
FOLATE (FOLIC ACID), SERUM: 4.2
HEMATOCRIT: 33.7
HEMATOCRIT: 33.7
HEMOGLOBIN A2 (QUANT): 2.4
HEMOGLOBIN: 10.3
HEMOGLOBIN: 10.3
HGB A: 97.6
HGB F: <1
INTERPRETATION: (no result)
IRON BIND.CAP.(TIBC): 260
IRON SATURATION: 20
IRON: 51
LYMPHOCYTES: 31.4
MCH: 24.3
MCH: 24.3
MCHC: 30.6
MCV: 79.7
MCV: 79.7
MONOCYTES: 12.6
MPV: 11.3
NEUTROPHILS: 53.1
PLATELET COUNT: 260
RDW: 14.7
RDW: 14.7
RED BLOOD CELL COUNT: 4.23
RED BLOOD CELL COUNT: 4.23
VITAMIN B12: 318
WHITE BLOOD CELL COUNT: 3.7

## 2025-06-04 ENCOUNTER — DASHBOARD ENCOUNTERS (OUTPATIENT)
Age: 75
End: 2025-06-04

## 2025-06-04 ENCOUNTER — OFFICE VISIT (OUTPATIENT)
Dept: URBAN - METROPOLITAN AREA CLINIC 113 | Facility: CLINIC | Age: 75
End: 2025-06-04
Payer: MEDICARE

## 2025-06-04 DIAGNOSIS — D50.9 MICROCYTIC ANEMIA: ICD-10-CM

## 2025-06-04 PROCEDURE — 99214 OFFICE O/P EST MOD 30 MIN: CPT | Performed by: INTERNAL MEDICINE

## 2025-06-04 RX ORDER — METFORMIN HYDROCHLORIDE 850 MG/1
1 TABLET WITH A MEAL TABLET, FILM COATED ORAL TWICE A DAY
Status: ON HOLD | COMMUNITY

## 2025-06-04 RX ORDER — EZETIMIBE 10 MG/1
1 TABLET TABLET ORAL ONCE A DAY
Status: ACTIVE | COMMUNITY

## 2025-06-04 RX ORDER — HYDROCHLOROTHIAZIDE 25 MG/1
1 TABLET IN THE MORNING TABLET ORAL ONCE A DAY
Status: ON HOLD | COMMUNITY

## 2025-06-04 RX ORDER — TELMISARTAN 80 MG/1
1 TABLET TABLET ORAL ONCE A DAY
Status: ACTIVE | COMMUNITY

## 2025-06-04 RX ORDER — MULTIVIT-MIN/IRON/FOLIC ACID/K 18-600-40
1 TABLET CAPSULE ORAL DAILY
Status: ACTIVE | COMMUNITY

## 2025-06-04 RX ORDER — PEN NEEDLE, DIABETIC 31 GX5/16"
AS DIRECTED NEEDLE, DISPOSABLE MISCELLANEOUS
Status: ON HOLD | COMMUNITY

## 2025-06-04 RX ORDER — NEBIVOLOL HYDROCHLORIDE 5 MG/1
1 TABLET TABLET ORAL DAILY
Status: ACTIVE | COMMUNITY

## 2025-06-04 RX ORDER — ATORVASTATIN CALCIUM 40 MG/1
1 TABLET TABLET, FILM COATED ORAL ONCE A DAY
Status: ACTIVE | COMMUNITY

## 2025-06-04 RX ORDER — VITAMIN A 2400 MCG
1 TABLET CAPSULE ORAL ONCE A DAY
Status: ACTIVE | COMMUNITY

## 2025-06-04 RX ORDER — SILDENAFIL 20 MG/1
1 TABLET TABLET, FILM COATED ORAL ONCE A DAY
Status: ON HOLD | COMMUNITY

## 2025-06-04 RX ORDER — OMEPRAZOLE 40 MG/1
1 CAPSULE 30 MINUTES BEFORE MORNING MEAL CAPSULE, DELAYED RELEASE ORAL ONCE A DAY
Qty: 30 | Status: ON HOLD | COMMUNITY
Start: 2023-01-03

## 2025-06-04 NOTE — HPI-TODAY'S VISIT:
Mr. Mijares is a 75-year-old  male here for follow up. He was last seen here on 2/26/24.  The patient was hospitalized at [facility] from May 27- May 28, 2021 with nausea and vomiting, and stated that he was "throwing up blood."  He  was seen by one of our physicians during that hospital stay.  I did have a chance to review the [facility] records.  He was hospitalized with hemoptysis, not hematemesis, and notably had a hemoglobin of approximately 9 with an MCV of 79.  He had a non-STEMI, and was evaluated by cardiology during that hospital stay.  He was not vomiting blood. He had a cardiac catheterization last year by Dr. Cunha which was negative. At the time of his 12/13/21 office visit, he was asymptomatic, and presented to be scheduled for a screening colonoscopy.  He had no rectal bleeding or change in bowel habits.  He had a prior colonoscopy on 1-24-11 for screening purposes. Internal hemorrhoids were found without other abnormalities. He denies GI complaints. No family history of GI disease or malignancy.   He underwent colonoscopy on January 31, 2022.  This only showed internal hemorrhoids and diverticulosis.  A 10-year follow-up examination was recommended.    The patient was found to have a fecal occult blood test which was positive for occult blood on November 9, 2022.  His hemoglobin around that time was 9.6, with a hematocrit of 30%.  His MCV was 73.7.  This was a decrease from a hemoglobin of 11.2 earlier that year.  He denied any hematemesis, coffee ground emesis, bright red rectal bleeding or melena.  He donates blood on a regular basis, and as a result is always anemic, with a baseline hemoglobin typically between 10 and 11, although it has been as low as 9 in the past.  He could not recall ever having had an upper endoscopy.  He was not having abdominal pain and does not use nonsteroidals, although he did take an 81 mg aspirin daily.  He was completely  asymptomatic at the time of his 1/3/23 visit, with no GI complaints.  His CBC was notable for a HgB of 10.5, Hct 32.2%.    The patient underwent upper GI endoscopy on January 27, 2023.  This revealed an H. pylori negative gastritis.  Small bowel biopsies were negative for sprue.  The patient had no complaints of GI bleeding, abdominal pain, change in bowel habits, nausea, vomiting, etc. at the  time of his 3/13/23 office visit.  Patient had a capsule enteroscopy done on March 31, 2023 which was normal.  He had a CBC done in October 2023 showed a white blood cell count of 4000, hemoglobin 10.3, hematocrit 30.1%, and a platelet count 256,000.  His MCV was 73.9.   The patient had labs done on February 28, 2024 showing a ferritin of 52, hemoglobin electrophoresis which was normal, a vitamin B12 level of 318 and a folic acid level of 4.2.  CBC showed a white blood cell count of 3700, hemoglobin 10.3, hematocrit 33.7% and a platelet count of 260,000.  Since that time, the patient is seen hematology (Dr. Alex Cassidy).  He had extensive workup including a bone marrow biopsy which was negative by his report.  He receives B12 injections every 2 weeks, as well as folic acid supplements, and takes 1 ferrous sulfate pill daily.  He still has no specific GI issues. He has had no clinically evident bleeding, and denies melena, abdominal pain, nausea, or other symptoms.  He also tells me he has had anemia "all of his life."  This was previously attributed to excessive blood donation.